# Patient Record
Sex: FEMALE | Race: WHITE
[De-identification: names, ages, dates, MRNs, and addresses within clinical notes are randomized per-mention and may not be internally consistent; named-entity substitution may affect disease eponyms.]

---

## 2020-06-09 ENCOUNTER — HOSPITAL ENCOUNTER (OUTPATIENT)
Dept: HOSPITAL 33 - OB | Age: 35
Setting detail: OBSERVATION
Discharge: HOME | End: 2020-06-09
Attending: FAMILY MEDICINE | Admitting: FAMILY MEDICINE
Payer: COMMERCIAL

## 2020-06-09 VITALS — HEART RATE: 81 BPM | SYSTOLIC BLOOD PRESSURE: 120 MMHG | DIASTOLIC BLOOD PRESSURE: 76 MMHG

## 2020-06-09 DIAGNOSIS — Z34.83: Primary | ICD-10-CM

## 2020-06-09 PROCEDURE — 76815 OB US LIMITED FETUS(S): CPT

## 2020-06-09 PROCEDURE — G0378 HOSPITAL OBSERVATION PER HR: HCPCS

## 2020-06-09 PROCEDURE — 59025 FETAL NON-STRESS TEST: CPT

## 2020-06-09 NOTE — XRAY
Indication: Evaluate GI.



Limited OB ultrasound demonstrates a single viable intrauterine pregnancy in

cephalic presentation with fetal heart rate 144 BPM.  Four-quadrant GI is

15.6 cm, previously 11.1 cm on May 22, 2020.

## 2020-06-12 ENCOUNTER — HOSPITAL ENCOUNTER (OUTPATIENT)
Dept: HOSPITAL 33 - OB | Age: 35
Setting detail: OBSERVATION
Discharge: HOME | End: 2020-06-12
Attending: FAMILY MEDICINE | Admitting: FAMILY MEDICINE
Payer: COMMERCIAL

## 2020-06-12 VITALS — HEART RATE: 86 BPM | DIASTOLIC BLOOD PRESSURE: 76 MMHG | OXYGEN SATURATION: 96 % | SYSTOLIC BLOOD PRESSURE: 133 MMHG

## 2020-06-12 DIAGNOSIS — Z34.83: Primary | ICD-10-CM

## 2020-06-12 PROCEDURE — G0378 HOSPITAL OBSERVATION PER HR: HCPCS

## 2020-06-12 PROCEDURE — 59025 FETAL NON-STRESS TEST: CPT

## 2020-06-16 ENCOUNTER — HOSPITAL ENCOUNTER (OUTPATIENT)
Dept: HOSPITAL 33 - OB | Age: 35
Setting detail: OBSERVATION
Discharge: HOME | End: 2020-06-16
Attending: FAMILY MEDICINE | Admitting: FAMILY MEDICINE
Payer: COMMERCIAL

## 2020-06-16 VITALS — OXYGEN SATURATION: 95 % | SYSTOLIC BLOOD PRESSURE: 132 MMHG | DIASTOLIC BLOOD PRESSURE: 79 MMHG | HEART RATE: 73 BPM

## 2020-06-16 DIAGNOSIS — Z34.83: Primary | ICD-10-CM

## 2020-06-16 PROCEDURE — 59025 FETAL NON-STRESS TEST: CPT

## 2020-06-16 PROCEDURE — G0378 HOSPITAL OBSERVATION PER HR: HCPCS

## 2020-06-16 PROCEDURE — 76815 OB US LIMITED FETUS(S): CPT

## 2020-06-16 NOTE — XRAY
Indication: Evaluate GI.



Limited OB ultrasound demonstrates single viable intrauterine pregnancy with

fetal heart rates 124 BPM.  Four-quadrant GI is 11.7 cm, previously 15.6 cm

on June 9, 2020.

## 2020-06-20 ENCOUNTER — HOSPITAL ENCOUNTER (OUTPATIENT)
Dept: HOSPITAL 33 - OB | Age: 35
Setting detail: OBSERVATION
Discharge: HOME | End: 2020-06-20
Attending: FAMILY MEDICINE | Admitting: FAMILY MEDICINE
Payer: COMMERCIAL

## 2020-06-20 VITALS — SYSTOLIC BLOOD PRESSURE: 125 MMHG | HEART RATE: 77 BPM | DIASTOLIC BLOOD PRESSURE: 78 MMHG

## 2020-06-20 DIAGNOSIS — Z34.83: Primary | ICD-10-CM

## 2020-06-20 PROCEDURE — G0378 HOSPITAL OBSERVATION PER HR: HCPCS

## 2020-06-20 PROCEDURE — 59025 FETAL NON-STRESS TEST: CPT

## 2020-06-23 ENCOUNTER — HOSPITAL ENCOUNTER (OUTPATIENT)
Dept: HOSPITAL 33 - OB | Age: 35
Setting detail: OBSERVATION
Discharge: HOME | End: 2020-06-23
Attending: FAMILY MEDICINE | Admitting: FAMILY MEDICINE
Payer: COMMERCIAL

## 2020-06-23 VITALS — DIASTOLIC BLOOD PRESSURE: 80 MMHG | SYSTOLIC BLOOD PRESSURE: 131 MMHG | HEART RATE: 84 BPM

## 2020-06-23 DIAGNOSIS — Z34.83: Primary | ICD-10-CM

## 2020-06-23 PROCEDURE — 76815 OB US LIMITED FETUS(S): CPT

## 2020-06-23 PROCEDURE — G0378 HOSPITAL OBSERVATION PER HR: HCPCS

## 2020-06-23 PROCEDURE — 59025 FETAL NON-STRESS TEST: CPT

## 2020-06-23 NOTE — XRAY
Indication: Evaluate GI.



Limited OB ultrasound demonstrates single viable intrauterine pregnancy with

fetal heart rate 150 bpm.  Four-quadrant GI is 15.3 cm, previously 11.7 cm on

June 16, 2020.

## 2020-06-25 ENCOUNTER — HOSPITAL ENCOUNTER (OUTPATIENT)
Dept: HOSPITAL 33 - OB | Age: 35
Setting detail: OBSERVATION
Discharge: HOME | End: 2020-06-25
Attending: FAMILY MEDICINE | Admitting: FAMILY MEDICINE
Payer: COMMERCIAL

## 2020-06-25 VITALS — HEART RATE: 96 BPM

## 2020-06-25 VITALS — SYSTOLIC BLOOD PRESSURE: 127 MMHG | DIASTOLIC BLOOD PRESSURE: 79 MMHG

## 2020-06-25 DIAGNOSIS — Z34.83: Primary | ICD-10-CM

## 2020-06-25 PROCEDURE — G0378 HOSPITAL OBSERVATION PER HR: HCPCS

## 2020-06-25 PROCEDURE — 59025 FETAL NON-STRESS TEST: CPT

## 2020-06-28 ENCOUNTER — HOSPITAL ENCOUNTER (OUTPATIENT)
Dept: HOSPITAL 33 - OB | Age: 35
Setting detail: OBSERVATION
Discharge: HOME | End: 2020-06-28
Attending: FAMILY MEDICINE | Admitting: FAMILY MEDICINE
Payer: COMMERCIAL

## 2020-06-28 VITALS — HEART RATE: 76 BPM

## 2020-06-28 DIAGNOSIS — Z34.83: Primary | ICD-10-CM

## 2020-06-28 LAB
ALBUMIN SERPL-MCNC: 3.5 G/DL (ref 3.5–5)
ALP SERPL-CCNC: 101 U/L (ref 38–126)
ALT SERPL-CCNC: 15 U/L (ref 0–35)
ANION GAP SERPL CALC-SCNC: 10.6 MEQ/L (ref 5–15)
AST SERPL QL: 21 U/L (ref 14–36)
BASOPHILS # BLD AUTO: 0 10*3/UL (ref 0–0.4)
BASOPHILS NFR BLD AUTO: 0 % (ref 0–0.4)
BILIRUB BLD-MCNC: 0.7 MG/DL (ref 0.2–1.3)
BUN SERPL-MCNC: 10 MG/DL (ref 7–17)
CALCIUM SPEC-MCNC: 9.2 MG/DL (ref 8.4–10.2)
CHLORIDE SERPL-SCNC: 110 MMOL/L (ref 98–107)
CO2 SERPL-SCNC: 18 MMOL/L (ref 22–30)
CREAT SERPL-MCNC: 0.51 MG/DL (ref 0.52–1.04)
CREAT UR-MCNC: 114.2 MG/DL (ref 30–125)
EOSINOPHIL # BLD AUTO: 0.05 10*3/UL (ref 0–0.5)
GLUCOSE SERPL-MCNC: 96 MG/DL (ref 74–106)
GLUCOSE UR-MCNC: NEGATIVE MG/DL
HCT VFR BLD AUTO: 35 % (ref 35–47)
HGB BLD-MCNC: 11.7 GM/DL (ref 12–16)
LYMPHOCYTES # SPEC AUTO: 1.76 10*3/UL (ref 1–4.6)
MCH RBC QN AUTO: 30.5 PG (ref 26–32)
MCHC RBC AUTO-ENTMCNC: 33.4 G/DL (ref 32–36)
MONOCYTES # BLD AUTO: 0.68 10*3/UL (ref 0–1.3)
PLATELET # BLD AUTO: 274 K/MM3 (ref 150–450)
POTASSIUM SERPLBLD-SCNC: 3.8 MMOL/L (ref 3.5–5.1)
PROT SERPL-MCNC: 6.5 G/DL (ref 6.3–8.2)
PROT UR STRIP-MCNC: NEGATIVE MG/DL
PROT UR-MCNC: 7 MG/DL (ref ?–12)
RBC # BLD AUTO: 3.83 M/MM3 (ref 4.1–5.4)
RBC #/AREA URNS HPF: (no result) /HPF (ref 0–2)
SODIUM SERPL-SCNC: 135 MMOL/L (ref 137–145)
WBC # BLD AUTO: 10.3 K/MM3 (ref 4–10.5)
WBC #/AREA URNS HPF: (no result) /HPF (ref 0–5)

## 2020-06-28 PROCEDURE — 80053 COMPREHEN METABOLIC PANEL: CPT

## 2020-06-28 PROCEDURE — 59025 FETAL NON-STRESS TEST: CPT

## 2020-06-28 PROCEDURE — G0378 HOSPITAL OBSERVATION PER HR: HCPCS

## 2020-06-28 PROCEDURE — 36415 COLL VENOUS BLD VENIPUNCTURE: CPT

## 2020-06-28 PROCEDURE — 85025 COMPLETE CBC W/AUTO DIFF WBC: CPT

## 2020-06-28 PROCEDURE — 84156 ASSAY OF PROTEIN URINE: CPT

## 2020-06-28 PROCEDURE — 82570 ASSAY OF URINE CREATININE: CPT

## 2020-06-28 PROCEDURE — 81001 URINALYSIS AUTO W/SCOPE: CPT

## 2020-06-30 ENCOUNTER — HOSPITAL ENCOUNTER (OUTPATIENT)
Dept: HOSPITAL 33 - OB | Age: 35
Setting detail: OBSERVATION
Discharge: HOME | End: 2020-06-30
Attending: FAMILY MEDICINE | Admitting: FAMILY MEDICINE
Payer: COMMERCIAL

## 2020-06-30 VITALS — HEART RATE: 82 BPM | DIASTOLIC BLOOD PRESSURE: 78 MMHG | SYSTOLIC BLOOD PRESSURE: 127 MMHG

## 2020-06-30 VITALS — OXYGEN SATURATION: 99 %

## 2020-06-30 DIAGNOSIS — Z34.83: Primary | ICD-10-CM

## 2020-06-30 PROCEDURE — 76816 OB US FOLLOW-UP PER FETUS: CPT

## 2020-06-30 PROCEDURE — 59025 FETAL NON-STRESS TEST: CPT

## 2020-06-30 PROCEDURE — G0378 HOSPITAL OBSERVATION PER HR: HCPCS

## 2020-06-30 NOTE — XRAY
Indication: Evaluate GI.



Limited OB ultrasound demonstrates single viable intrauterine pregnancy with

fetal heart rate 140 BPM.  Four-quadrant GI is 19.4 cm, previously 15.3 cm on

June 23, 2020.

## 2020-07-02 ENCOUNTER — HOSPITAL ENCOUNTER (OUTPATIENT)
Dept: HOSPITAL 33 - OB | Age: 35
Setting detail: OBSERVATION
Discharge: HOME | End: 2020-07-02
Attending: FAMILY MEDICINE | Admitting: FAMILY MEDICINE
Payer: COMMERCIAL

## 2020-07-02 VITALS — OXYGEN SATURATION: 96 % | SYSTOLIC BLOOD PRESSURE: 133 MMHG | DIASTOLIC BLOOD PRESSURE: 82 MMHG | HEART RATE: 84 BPM

## 2020-07-02 DIAGNOSIS — Z34.83: Primary | ICD-10-CM

## 2020-07-02 PROCEDURE — 59025 FETAL NON-STRESS TEST: CPT

## 2020-07-02 PROCEDURE — G0378 HOSPITAL OBSERVATION PER HR: HCPCS

## 2020-07-07 ENCOUNTER — HOSPITAL ENCOUNTER (OUTPATIENT)
Dept: HOSPITAL 33 - OB | Age: 35
Setting detail: OBSERVATION
Discharge: HOME | End: 2020-07-07
Attending: FAMILY MEDICINE | Admitting: FAMILY MEDICINE
Payer: COMMERCIAL

## 2020-07-07 VITALS — HEART RATE: 79 BPM | DIASTOLIC BLOOD PRESSURE: 80 MMHG | SYSTOLIC BLOOD PRESSURE: 135 MMHG

## 2020-07-07 DIAGNOSIS — Z34.83: Primary | ICD-10-CM

## 2020-07-07 PROCEDURE — 59025 FETAL NON-STRESS TEST: CPT

## 2020-07-07 PROCEDURE — 76815 OB US LIMITED FETUS(S): CPT

## 2020-07-07 PROCEDURE — G0378 HOSPITAL OBSERVATION PER HR: HCPCS

## 2020-07-07 NOTE — XRAY
Exam: Limited OB ultrasound follow-up for GI check from 7/7/2020.



Comparison: Limited OB ultrasound from 6/30/2020.



Indication: Evaluate GI.



Findings: Limited OB ultrasound demonstrates a single live intrauterine fetus

in the cephalic lie which demonstrates a fetal heart rate of 138 bpm.  4

quadrant GI is 21.96 cm, previously 19.4 cm on 6/30/2020.  The 95th

percentile for amniotic fluid index for a 36 week old fetus is 24.9 cm.

Correlate clinically.



Impression:



1.  As above.

## 2020-07-09 ENCOUNTER — HOSPITAL ENCOUNTER (OUTPATIENT)
Dept: HOSPITAL 33 - OB | Age: 35
Setting detail: OBSERVATION
Discharge: HOME | End: 2020-07-09
Attending: FAMILY MEDICINE | Admitting: FAMILY MEDICINE
Payer: COMMERCIAL

## 2020-07-09 VITALS — DIASTOLIC BLOOD PRESSURE: 86 MMHG | SYSTOLIC BLOOD PRESSURE: 138 MMHG | HEART RATE: 87 BPM

## 2020-07-09 DIAGNOSIS — O09.92: Primary | ICD-10-CM

## 2020-07-09 DIAGNOSIS — Z3A.36: ICD-10-CM

## 2020-07-09 PROCEDURE — 87081 CULTURE SCREEN ONLY: CPT

## 2020-07-09 PROCEDURE — 59025 FETAL NON-STRESS TEST: CPT

## 2020-07-09 PROCEDURE — 76816 OB US FOLLOW-UP PER FETUS: CPT

## 2020-07-09 PROCEDURE — G0378 HOSPITAL OBSERVATION PER HR: HCPCS

## 2020-07-09 NOTE — XRAY
Indication: High-risk pregnancy.



Two-dimensional OB ultrasound performed.



Comparison: May 22, 2020.



Again there is a single viable intrauterine pregnancy in cephalic

presentation.  Fetal heart rate 176 BPM.  Again anterior placenta without

abruption/previa.



BPD measures 9.34 cm corresponding to 38 weeks 0 days.

HC measures 33.64 cm corresponding to 38 weeks 4 days.

AC measures 35.02 cm corresponding to 38 weeks 6 days.

FL measures 7.42 cm corresponding to 38 weeks 0 days.

Estimated fetal weight 7 lbs. 12 oz., +/-1 lb. 3 oz.  Approximately 95th

percentile.

GI is 13.7 cm.



Impression: Again single viable intrauterine pregnancy with mean gestational

age 38 weeks 3 days.  There has been progression in the pregnancy.  Fetus now

measures 9 days larger with respect to the comparison exam and 13 days larger

with respect to first exam December 10, 2019.

## 2020-07-13 ENCOUNTER — HOSPITAL ENCOUNTER (INPATIENT)
Dept: HOSPITAL 33 - OB | Age: 35
LOS: 2 days | Discharge: HOME | End: 2020-07-15
Attending: FAMILY MEDICINE | Admitting: FAMILY MEDICINE
Payer: COMMERCIAL

## 2020-07-13 DIAGNOSIS — Z3A.37: ICD-10-CM

## 2020-07-13 LAB
ALBUMIN SERPL-MCNC: 3.5 G/DL (ref 3.5–5)
ALP SERPL-CCNC: 129 U/L (ref 38–126)
ALT SERPL-CCNC: 16 U/L (ref 0–35)
AMPHETAMINES UR QL: NEGATIVE
ANION GAP SERPL CALC-SCNC: 11.8 MEQ/L (ref 5–15)
AST SERPL QL: 27 U/L (ref 14–36)
BARBITURATES UR QL: NEGATIVE
BASOPHILS # BLD AUTO: 0.01 10*3/UL (ref 0–0.4)
BASOPHILS NFR BLD AUTO: 0.1 % (ref 0–0.4)
BENZODIAZ UR QL SCN: NEGATIVE
BILIRUB BLD-MCNC: 0.6 MG/DL (ref 0.2–1.3)
BUN SERPL-MCNC: 9 MG/DL (ref 7–17)
CALCIUM SPEC-MCNC: 9.3 MG/DL (ref 8.4–10.2)
CHLORIDE SERPL-SCNC: 108 MMOL/L (ref 98–107)
CO2 SERPL-SCNC: 19 MMOL/L (ref 22–30)
COCAINE UR QL SCN: NEGATIVE
CREAT SERPL-MCNC: 0.42 MG/DL (ref 0.52–1.04)
EOSINOPHIL # BLD AUTO: 0.05 10*3/UL (ref 0–0.5)
GLUCOSE SERPL-MCNC: 93 MG/DL (ref 74–106)
HCT VFR BLD AUTO: 34.1 % (ref 35–47)
HGB BLD-MCNC: 11.6 GM/DL (ref 12–16)
LYMPHOCYTES # SPEC AUTO: 2.22 10*3/UL (ref 1–4.6)
MCH RBC QN AUTO: 30.8 PG (ref 26–32)
MCHC RBC AUTO-ENTMCNC: 34 G/DL (ref 32–36)
METHADONE UR QL: NEGATIVE
MONOCYTES # BLD AUTO: 0.92 10*3/UL (ref 0–1.3)
OPIATES UR QL: NEGATIVE
PCP UR QL CFM>20 NG/ML: NEGATIVE
PLATELET # BLD AUTO: 303 K/MM3 (ref 150–450)
POTASSIUM SERPLBLD-SCNC: 3.6 MMOL/L (ref 3.5–5.1)
PROT SERPL-MCNC: 6.6 G/DL (ref 6.3–8.2)
RBC # BLD AUTO: 3.77 M/MM3 (ref 4.1–5.4)
SODIUM SERPL-SCNC: 136 MMOL/L (ref 137–145)
THC UR QL SCN: NEGATIVE
URATE FLD-MCNC: 5.2 MG/DL (ref 2.6–6)
WBC # BLD AUTO: 11.7 K/MM3 (ref 4–10.5)

## 2020-07-13 PROCEDURE — 87340 HEPATITIS B SURFACE AG IA: CPT

## 2020-07-13 PROCEDURE — G0378 HOSPITAL OBSERVATION PER HR: HCPCS

## 2020-07-13 PROCEDURE — 87086 URINE CULTURE/COLONY COUNT: CPT

## 2020-07-13 PROCEDURE — 85025 COMPLETE CBC W/AUTO DIFF WBC: CPT

## 2020-07-13 PROCEDURE — 36415 COLL VENOUS BLD VENIPUNCTURE: CPT

## 2020-07-13 PROCEDURE — 80053 COMPREHEN METABOLIC PANEL: CPT

## 2020-07-13 PROCEDURE — 96372 THER/PROPH/DIAG INJ SC/IM: CPT

## 2020-07-13 PROCEDURE — 80307 DRUG TEST PRSMV CHEM ANLYZR: CPT

## 2020-07-13 PROCEDURE — 84550 ASSAY OF BLOOD/URIC ACID: CPT

## 2020-07-14 VITALS — OXYGEN SATURATION: 97 %

## 2020-07-14 LAB
BASOPHILS # BLD AUTO: 0.01 10*3/UL (ref 0–0.4)
BASOPHILS NFR BLD AUTO: 0.1 % (ref 0–0.4)
EOSINOPHIL # BLD AUTO: 0.04 10*3/UL (ref 0–0.5)
HCT VFR BLD AUTO: 33.2 % (ref 35–47)
HGB BLD-MCNC: 11.1 GM/DL (ref 12–16)
LYMPHOCYTES # SPEC AUTO: 1.93 10*3/UL (ref 1–4.6)
MCH RBC QN AUTO: 30.7 PG (ref 26–32)
MCHC RBC AUTO-ENTMCNC: 33.4 G/DL (ref 32–36)
MONOCYTES # BLD AUTO: 1.43 10*3/UL (ref 0–1.3)
PLATELET # BLD AUTO: 269 K/MM3 (ref 150–450)
RBC # BLD AUTO: 3.62 M/MM3 (ref 4.1–5.4)
WBC # BLD AUTO: 19.1 K/MM3 (ref 4–10.5)

## 2020-07-14 PROCEDURE — 10D17Z9 MANUAL EXTRACTION OF PRODUCTS OF CONCEPTION, RETAINED, VIA NATURAL OR ARTIFICIAL OPENING: ICD-10-PCS | Performed by: FAMILY MEDICINE

## 2020-07-14 RX ADMIN — CLINDAMYCIN IN 5 PERCENT DEXTROSE SCH MLS/HR: 12 INJECTION, SOLUTION INTRAVENOUS at 08:15

## 2020-07-14 RX ADMIN — HYDROCODONE BITARTRATE AND ACETAMINOPHEN PRN TAB: 5; 325 TABLET ORAL at 08:56

## 2020-07-14 RX ADMIN — DOCUSATE SODIUM SCH: 100 CAPSULE, LIQUID FILLED ORAL at 21:47

## 2020-07-14 RX ADMIN — IBUPROFEN PRN MG: 400 TABLET ORAL at 17:01

## 2020-07-14 RX ADMIN — DOCUSATE SODIUM SCH: 100 CAPSULE, LIQUID FILLED ORAL at 20:00

## 2020-07-14 RX ADMIN — CLINDAMYCIN IN 5 PERCENT DEXTROSE SCH MLS/HR: 12 INJECTION, SOLUTION INTRAVENOUS at 20:16

## 2020-07-14 RX ADMIN — CLINDAMYCIN IN 5 PERCENT DEXTROSE SCH MLS/HR: 12 INJECTION, SOLUTION INTRAVENOUS at 14:05

## 2020-07-14 RX ADMIN — HYDROCODONE BITARTRATE AND ACETAMINOPHEN PRN TAB: 5; 325 TABLET ORAL at 23:32

## 2020-07-14 RX ADMIN — HYDROCODONE BITARTRATE AND ACETAMINOPHEN PRN TAB: 5; 325 TABLET ORAL at 13:24

## 2020-07-15 VITALS — HEART RATE: 89 BPM | DIASTOLIC BLOOD PRESSURE: 69 MMHG | SYSTOLIC BLOOD PRESSURE: 120 MMHG

## 2020-07-15 LAB
BASOPHILS # BLD AUTO: 0.02 10*3/UL (ref 0–0.4)
BASOPHILS NFR BLD AUTO: 0.2 % (ref 0–0.4)
EOSINOPHIL # BLD AUTO: 0.13 10*3/UL (ref 0–0.5)
HCT VFR BLD AUTO: 29.5 % (ref 35–47)
HGB BLD-MCNC: 9.7 GM/DL (ref 12–16)
LYMPHOCYTES # SPEC AUTO: 3.36 10*3/UL (ref 1–4.6)
MCH RBC QN AUTO: 30.5 PG (ref 26–32)
MCHC RBC AUTO-ENTMCNC: 32.9 G/DL (ref 32–36)
MONOCYTES # BLD AUTO: 1 10*3/UL (ref 0–1.3)
PLATELET # BLD AUTO: 255 K/MM3 (ref 150–450)
RBC # BLD AUTO: 3.18 M/MM3 (ref 4.1–5.4)
WBC # BLD AUTO: 12.7 K/MM3 (ref 4–10.5)

## 2020-07-15 RX ADMIN — DOCUSATE SODIUM SCH: 100 CAPSULE, LIQUID FILLED ORAL at 11:14

## 2020-07-15 RX ADMIN — CLINDAMYCIN IN 5 PERCENT DEXTROSE SCH MLS/HR: 12 INJECTION, SOLUTION INTRAVENOUS at 02:38

## 2020-07-15 RX ADMIN — IBUPROFEN PRN MG: 400 TABLET ORAL at 04:12

## 2020-07-15 RX ADMIN — HYDROCODONE BITARTRATE AND ACETAMINOPHEN PRN TAB: 5; 325 TABLET ORAL at 11:12

## 2020-07-15 NOTE — PCM.DS
Discharge Summary


Date of Admission: 


20 03:00





Admitting Physician: 


KARELY MAURICIO





Consults: 





                                Consults on Case





20 20:34


Notify  Anesthesia Provider PRN 











Primary Care Provider: 


KARELY MAURICIO








Allergies


Allergies





Penicillins Allergy (Unknown, Verified 20 15:34)


   Swelling











Hospital Summary





- Hospital Course


Hospital Course: 





Pt admitted as 35 yo  at 37 weeks for IOL due to uncontrolled GDM.  Pt 

delivered 7lb 3oz female infant over intact perineum; apgars 9 at 1 min and 9 at

5 min.  Placenta had to be manually removed by me and was questionable whether 

it was intact; she received IV clindamycin x 24 h post partum.  Had some PPH 

requiring 1 dose of hemabate, after which the bleeding decreased.  Bleeding is 

now wnl.  Joe po. Breastfeeding.  No dizziness.  Hgb this morning 9.7.  Pt would

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  She will watch for any fever or signs of 

infection.  RTC 4-6 weeks.  Will do a1c in 3 months.





- Vitals & Intake/Output


Vital Signs: 





                                   Vital Signs











Temperature  97.8 F   07/15/20 03:00


 


Pulse Rate  75   07/15/20 03:00


 


Respiratory Rate  18   07/15/20 03:00


 


Blood Pressure  117/63   07/15/20 03:00


 


O2 Sat by Pulse Oximetry  97   20 06:00











Intake & Output: 





                                 Intake & Output











 07/12/20 07/13/20 07/14/20 07/15/20





 11:59 11:59 11:59 11:59


 


Intake Total   999 2600


 


Output Total   1600 


 


Balance   -601 2600


 


Weight   109.769 kg 














- Lab


Result Diagrams: 


                                 07/15/20 05:22





                                 20 21:23


Lab Results-Last 24 Hrs: 





                            Lab Results-Last 24 Hours











  07/14/20 07/15/20 Range/Units





  11:51 05:22 


 


WBC  19.1 H  12.7 H  (4.0-10.5)  K/mm3


 


RBC  3.62 L  3.18 L  (4.1-5.4)  M/mm3


 


Hgb  11.1 L  9.7 L  (12.0-16.0)  gm/dl


 


Hct  33.2 L  29.5 L  (35-47)  %


 


MCV  91.7  92.8  ()  fl


 


MCH  30.7  30.5  (26-32)  pg


 


MCHC  33.4  32.9  (32-36)  g/dl


 


RDW  13.8  13.9  (11.5-14.0)  %


 


Plt Count  269  255  (150-450)  K/mm3


 


MPV  9.3  9.4  (7.5-11.0)  fl


 


Gran %  82.1 H  64.4  (36.0-66.0)  %


 


Eos # (Auto)  0.04  0.13  (0-0.5)  


 


Absolute Lymphs (auto)  1.93  3.36  (1.0-4.6)  


 


Absolute Monos (auto)  1.43 H  1.00  (0.0-1.3)  


 


Lymphocytes %  10.1 L  26.5  (24.0-44.0)  %


 


Monocytes %  7.5  7.9  (0.0-12.0)  %


 


Eosinophils %  0.2  1.0  (0.00-5.0)  %


 


Basophils %  0.1  0.2  (0.0-0.4)  %


 


Absolute Granulocytes  15.69 H  8.17 H  (1.4-6.9)  


 


Basophils #  0.01  0.02  (0-0.4)  














Discharge Exam


General Appearance: no apparent distress, alert


Neurologic Exam: oriented x 3, cooperative


Eye Exam: eyes nml inspection


Ears, Nose, Throat Exam: moist mucous membranes


Neck Exam: normal inspection


Respiratory Exam: normal breath sounds, lungs clear, No crackles/rales, No 

rhonchi, No wheezing


Cardiovascular Exam: regular rate/rhythm, normal heart sounds, No murmur


Gastrointestinal/Abdomen Exam: soft, normal bowel sounds, other (fundus firm 

inferior to umbilicus), No tenderness, No distention, No guarding, No rebound


Extremity Exam: swelling (trace pretibial edema bilat), No tenderness


Skin Exam: normal color, warm, dry, No rash





Final Diagnosis/Problem List





- Final Discharge Diagnosis/Problem


(1) Spontaneous vaginal delivery


Current Visit: Yes   Status: Acute   


Assessment & Plan: 


PPD #1, doing great, OK to discharge to home today.


Code(s): O80 - ENCOUNTER FOR FULL-TERM UNCOMPLICATED DELIVERY   





(2) manual removal of the placenta


Current Visit: Yes   Status: Resolved   


Assessment & Plan: 


no sign of infection








(3) Gestational diabetes mellitus


Current Visit: Yes   Status: Resolved   


Assessment & Plan: 


will check a1c in 3 mo


Code(s): O24.419 - GESTATIONAL DIABETES MELLITUS IN PREGNANCY, UNSP CONTROL   





- Discharge


Disposition: Home, Self-Care


Condition: Good


Prescriptions: 


New


   Ibuprofen 800 mg PO TID PRN #35 tablet


     PRN Reason: Pain





Continue


   Aspirin 81 gm Chew*** [Baby Aspirin 81 mg Chew***] 81 mg PO DAILY


   Prenatal Vits W-Ca,Fe,FA(<1Mg) [Prenatal] 1 tablet PO DAILY


   Docusate Sodium 100 mg*** [Colace 100 MG***] 200 mg PO DAILY





Discontinued


   Metformin HCl 500 mg*** [Glucophage 500 MG***] 500 mg PO BID


Follow up with: 


KARELY MAURICIO [Primary Care Provider] - 1 Week